# Patient Record
Sex: MALE | Race: BLACK OR AFRICAN AMERICAN
[De-identification: names, ages, dates, MRNs, and addresses within clinical notes are randomized per-mention and may not be internally consistent; named-entity substitution may affect disease eponyms.]

---

## 2020-11-11 ENCOUNTER — HOSPITAL ENCOUNTER (EMERGENCY)
Dept: HOSPITAL 72 - EMR | Age: 25
Discharge: HOME | End: 2020-11-11
Payer: SELF-PAY

## 2020-11-11 VITALS — SYSTOLIC BLOOD PRESSURE: 122 MMHG | DIASTOLIC BLOOD PRESSURE: 73 MMHG

## 2020-11-11 VITALS — HEIGHT: 65 IN | BODY MASS INDEX: 19.99 KG/M2 | WEIGHT: 120 LBS

## 2020-11-11 VITALS — SYSTOLIC BLOOD PRESSURE: 119 MMHG | DIASTOLIC BLOOD PRESSURE: 72 MMHG

## 2020-11-11 DIAGNOSIS — S52.615A: Primary | ICD-10-CM
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
## 2020-11-11 PROCEDURE — 29125 APPL SHORT ARM SPLINT STATIC: CPT

## 2020-11-11 PROCEDURE — 99283 EMERGENCY DEPT VISIT LOW MDM: CPT

## 2020-11-11 NOTE — NUR
ED Nurse Note:



Pt cleared by ERPA for discharge.  DC instructions/prescription was given and 
explained to pt and verbalized understanding of teachings. All medical deviecs 
such as ID band  removed. Pt is AAO x4, ambulatory and left with all personal 
belongings.

## 2020-11-11 NOTE — NUR
ED Nurse Note:



Patient walked in to ED c/o left wrist pain S/P fall x2 days. Pt able to move 
his fingers/ hand. AAOx4,verbally responsive. No SOB, on room air. ERPA at 
bedside.

## 2020-11-11 NOTE — DIAGNOSTIC IMAGING REPORT
Clinical Indication:Left wrist pain

 

Technique: 3 views of the left wrist

 

Comparison: None

 

Findings: There is an age-indeterminate fracture of the ulnar styloid. No radial

ulnar carpal fracture demonstrated. The joint spaces are preserved.

 

Impression: Age-indeterminate ulnar styloid fracture. Correlate with clinical history

and findings

 

No other acute bony trauma

## 2022-03-01 NOTE — EMERGENCY ROOM REPORT
Anesthesia Evaluation     Patient summary reviewed and Nursing notes reviewed   no history of anesthetic complications:  NPO Solid Status: > 8 hours  NPO Liquid Status: > 2 hours           Airway   Mallampati: II  TM distance: >3 FB  Neck ROM: full  No difficulty expected  Dental      Pulmonary - normal exam    breath sounds clear to auscultation  (+) pneumonia ,   Cardiovascular - normal exam  Exercise tolerance: good (4-7 METS)    Rhythm: regular  Rate: normal    (+) hypertension, CAD, angina, hyperlipidemia,       Neuro/Psych- negative ROS  GI/Hepatic/Renal/Endo    (+) obesity,  GERD, GI bleeding ,     Musculoskeletal     Abdominal    Substance History - negative use     OB/GYN negative ob/gyn ROS         Other   arthritis,             Results for JEAN PIERRE DAI (MRN [id number]) as of 3/1/2022 09:15   Ref. Range 3/1/2022 02:30   Hemoglobin Latest Ref Range: 12.0 - 15.9 g/dL 7.2 (L)   Hematocrit Latest Ref Range: 34.0 - 46.6 % 21.8 (L)            Anesthesia Plan    ASA 3     general   (Total IV Anesthesia    Patient understands anesthesia not responsible for dental damage.    Suspect bleeding ulcer, pt with gastric bypass on anticoagulants for cad/stents, received 1u prbc  )  intravenous induction     Anesthetic plan, all risks, benefits, and alternatives have been provided, discussed and informed consent has been obtained with: patient.    Plan discussed with CRNA.        CODE STATUS:    Level Of Support Discussed With: Patient  Code Status (Patient has no pulse and is not breathing): CPR (Attempt to Resuscitate)  Medical Interventions (Patient has pulse or is breathing): Full Support       History of Present Illness


General


Chief Complaint:  Upper Extremity Injury


Source:  Patient





Present Illness


HPI


Pt. presents to the ED c/o 7/10 in severity Left wrist pain x 2 days. S/p 

mechanical fall while playing soccer. Pt. reports tenderness with movements or 

palpation. Pt. denies bruising. He reports some swelling. Pt. reports he is 

right hand dominant. Denies paresthesias or skin color changes. Pt. has tried 

taking tylenol as well as tylenol # 3 with no relief of his symptoms.  Denies 

loss of gross motor movements of the affected extremity.


Allergies:  


Coded Allergies:  


     No Known Allergies (Unverified , 11/11/20)





COVID-19 Screening


Contact w/high risk pt:  No


Experienced COVID-19 symptoms?:  No


COVID-19 Testing performed PTA:  No





Patient History


Past Medical History:  see triage record


Past Surgical History:  none


Pertinent Family History:  none


Reviewed Nursing Documentation:  PMH: Agreed; PSxH: Agreed





Nursing Documentation-PMH


Past Medical History:  No Stated History





Review of Systems


All Other Systems:  negative except mentioned in HPI





Physical Exam





Vital Signs








  Date Time  Temp Pulse Resp B/P (MAP) Pulse Ox O2 Delivery O2 Flow Rate FiO2


 


11/11/20 15:45 98.4 58 16 122/73 (89) 96 Room Air  








Sp02 EP Interpretation:  reviewed, normal


General Appearance:  no apparent distress, alert, GCS 15, non-toxic


Head:  normocephalic, atraumatic


Eyes:  bilateral eye normal inspection, bilateral eye PERRL


ENT:  hearing grossly normal, normal voice


Neck:  full range of motion


Respiratory:  lungs clear, normal breath sounds, speaking full sentences


Cardiovascular #1:  regular rate, rhythm, normal capillary refill


Cardiovascular #2:  2+ radial (L)


Musculoskeletal:  back normal, normal range of motion, gait/station normal, 

tender - Left wrist ttp, no snuff box ttp. swelling is visible. no bruises. FROM

of all fingers. Pain with ROM testing of the left wrist.


Neurologic:  alert, motor strength/tone normal, oriented x3, sensory intact, 

responsive, speech normal


Psychiatric:  judgement/insight normal


Skin:  normal color - no bruises





Medical Decision Making


PA Attestation


Dr. Membreno Is my supervising Physician whom patient management has been 

discussed with.


Diagnostic Impression:  


   Primary Impression:  


   Fracture of ulnar styloid


   Qualified Codes:  S52.615A - Nondisplaced fracture of left ulna styloid 

   process, initial encounter for closed fracture


ER Course


Pt. presents to the ED c/o 7/10 in severity Left wrist pain x 2 days. S/p 

mechanical fall while playing soccer. Pt. reports tenderness with movements or 

palpation. Pt. denies bruising. He reports some swelling. Pt. reports he is 

right hand dominant. Denies paresthesias or skin color changes. Pt. has tried 

taking tylenol as well as tylenol # 3 with no relief of his symptoms.  Denies 

loss of gross motor movements of the affected extremity.





Ddx considered but are not limited to Fracture, dislocation, contusion,  

Sprain/Strain/Spasm, scaphoid injury.


Vital signs: are WNL, pt. is afebrile


H&PE are most consistent with musculoskeletal injury will perform imaging to r/o

fractures/dislocations. 





ORDERS:





-  X-ray: Positive for distal unlar styloid fx. 





ED INTERVENTIONS:





- Left Wrist Splint applied  by ED tech. Pt. remains neurovascularly intact.





DISCHARGE: At this time pt. is stable for d/c to home. Will provide printed 

patient care instructions, and any necessary prescriptions. Care plan and follow

up instructions have been discussed with the patient prior to discharge.


Other X-Ray Diagnostic Results


Other X-Ray Diagnostic Results :  


   X-Ray ordered:  Left Wrist Splint


   # of Views/Limited Vs Complete:  2 View


   Indication:  Pain


   EP Interpretation:  Yes


   PA Xray:  Interpretation reviewed, by supervising MD, and agrees with 

findings.


   Interpretation:  no dislocation, no soft tissue swelling, other - Ulnar 

styloid fracture


   Impression:  Other


   Electronically Signed by:  Lauryn ding PA-C





Last Vital Signs








  Date Time  Temp Pulse Resp B/P (MAP) Pulse Ox O2 Delivery O2 Flow Rate FiO2


 


11/11/20 15:50 98.4 58 16 122/73 96 Room Air  








Status:  improved


Disposition:  HOME, SELF-CARE


Condition:  Stable


Scripts


Ibuprofen* (MOTRIN*) 600 Mg Tablet


600 MG ORAL THREE TIMES A DAY, #20 TAB


   Prov: Lauryn Ding         11/11/20 


Hydrocodone Bit/Acetaminophen 5-325* (NORCO 5-325 TABLET*) 1 Each Tablet


1 TAB ORAL Q6H PRN for FOR PAIN, #12 TAB 0 Refills


   Prov: Lauryn Ding         11/11/20


Referrals:  


NOT CHOSEN IPA/MD,REFERRING (PCP)











Orthopedic Urgent Care


Patient Instructions:  Ulnar Fracture





Additional Instructions:  


Take medications as directed. Do not drink alcohol, drive, or operate heavy 

machinery while taking Norco as this may cause drowsiness. 








 ** Follow up with an ORTHOPEDIC SPECIALIST in 3-5 days, even if your symptoms 

have resolved. ** 








--Please review list of primary care clinics, if you do not already have a 

primary care provider who can give you an Orthopedic Referral. 





Return sooner to ED if new symptoms occur, or current symptoms become worse. 











- Please note that this Emergency Department Report was dictated using [employer] technology software, occasionally this can lead to 

erroneous entry secondary to interpretation by the dictation equipment.











Lauryn Ding              Nov 11, 2020 16:34